# Patient Record
Sex: FEMALE | Race: WHITE | NOT HISPANIC OR LATINO | Employment: OTHER | URBAN - METROPOLITAN AREA
[De-identification: names, ages, dates, MRNs, and addresses within clinical notes are randomized per-mention and may not be internally consistent; named-entity substitution may affect disease eponyms.]

---

## 2018-08-14 PROBLEM — I51.89 DIASTOLIC DYSFUNCTION: Status: ACTIVE | Noted: 2018-08-14

## 2021-05-07 PROBLEM — Z00.00 WELL ADULT EXAM: Status: ACTIVE | Noted: 2021-05-07

## 2021-07-02 RX ORDER — ESTRADIOL 2 MG/1
2 TABLET ORAL DAILY
Qty: 90 TABLET | Refills: 4 | Status: SHIPPED | OUTPATIENT
Start: 2021-07-02 | End: 2022-01-04 | Stop reason: SDUPTHER

## 2021-08-09 PROBLEM — Z00.00 WELL ADULT EXAM: Status: RESOLVED | Noted: 2021-05-07 | Resolved: 2021-08-09

## 2022-01-04 ENCOUNTER — OFFICE VISIT (OUTPATIENT)
Dept: OBSTETRICS AND GYNECOLOGY | Facility: CLINIC | Age: 62
End: 2022-01-04
Payer: COMMERCIAL

## 2022-01-04 VITALS
DIASTOLIC BLOOD PRESSURE: 82 MMHG | SYSTOLIC BLOOD PRESSURE: 128 MMHG | BODY MASS INDEX: 24.25 KG/M2 | WEIGHT: 145.75 LBS

## 2022-01-04 DIAGNOSIS — Z01.419 ENCOUNTER FOR ANNUAL ROUTINE GYNECOLOGICAL EXAMINATION: Primary | ICD-10-CM

## 2022-01-04 DIAGNOSIS — N60.02 BREAST CYST, LEFT: ICD-10-CM

## 2022-01-04 PROCEDURE — 1160F RVW MEDS BY RX/DR IN RCRD: CPT | Mod: CPTII,S$GLB,, | Performed by: OBSTETRICS & GYNECOLOGY

## 2022-01-04 PROCEDURE — 1160F PR REVIEW ALL MEDS BY PRESCRIBER/CLIN PHARMACIST DOCUMENTED: ICD-10-PCS | Mod: CPTII,S$GLB,, | Performed by: OBSTETRICS & GYNECOLOGY

## 2022-01-04 PROCEDURE — 3008F PR BODY MASS INDEX (BMI) DOCUMENTED: ICD-10-PCS | Mod: CPTII,S$GLB,, | Performed by: OBSTETRICS & GYNECOLOGY

## 2022-01-04 PROCEDURE — 3008F BODY MASS INDEX DOCD: CPT | Mod: CPTII,S$GLB,, | Performed by: OBSTETRICS & GYNECOLOGY

## 2022-01-04 PROCEDURE — 3074F SYST BP LT 130 MM HG: CPT | Mod: CPTII,S$GLB,, | Performed by: OBSTETRICS & GYNECOLOGY

## 2022-01-04 PROCEDURE — 99396 PR PREVENTIVE VISIT,EST,40-64: ICD-10-PCS | Mod: S$GLB,,, | Performed by: OBSTETRICS & GYNECOLOGY

## 2022-01-04 PROCEDURE — 1159F MED LIST DOCD IN RCRD: CPT | Mod: CPTII,S$GLB,, | Performed by: OBSTETRICS & GYNECOLOGY

## 2022-01-04 PROCEDURE — 99999 PR PBB SHADOW E&M-EST. PATIENT-LVL III: ICD-10-PCS | Mod: PBBFAC,,, | Performed by: OBSTETRICS & GYNECOLOGY

## 2022-01-04 PROCEDURE — 3074F PR MOST RECENT SYSTOLIC BLOOD PRESSURE < 130 MM HG: ICD-10-PCS | Mod: CPTII,S$GLB,, | Performed by: OBSTETRICS & GYNECOLOGY

## 2022-01-04 PROCEDURE — 99396 PREV VISIT EST AGE 40-64: CPT | Mod: S$GLB,,, | Performed by: OBSTETRICS & GYNECOLOGY

## 2022-01-04 PROCEDURE — 3079F PR MOST RECENT DIASTOLIC BLOOD PRESSURE 80-89 MM HG: ICD-10-PCS | Mod: CPTII,S$GLB,, | Performed by: OBSTETRICS & GYNECOLOGY

## 2022-01-04 PROCEDURE — 1159F PR MEDICATION LIST DOCUMENTED IN MEDICAL RECORD: ICD-10-PCS | Mod: CPTII,S$GLB,, | Performed by: OBSTETRICS & GYNECOLOGY

## 2022-01-04 PROCEDURE — 99999 PR PBB SHADOW E&M-EST. PATIENT-LVL III: CPT | Mod: PBBFAC,,, | Performed by: OBSTETRICS & GYNECOLOGY

## 2022-01-04 PROCEDURE — 3079F DIAST BP 80-89 MM HG: CPT | Mod: CPTII,S$GLB,, | Performed by: OBSTETRICS & GYNECOLOGY

## 2022-01-04 RX ORDER — ESTRADIOL 2 MG/1
2 TABLET ORAL DAILY
Qty: 90 TABLET | Refills: 4 | Status: SHIPPED | OUTPATIENT
Start: 2022-01-04 | End: 2022-01-04

## 2022-01-04 RX ORDER — ESTRADIOL 2 MG/1
2 TABLET ORAL DAILY
Qty: 90 TABLET | Refills: 4 | Status: SHIPPED | OUTPATIENT
Start: 2022-01-04 | End: 2023-01-04 | Stop reason: SDUPTHER

## 2022-01-04 NOTE — PROGRESS NOTES
Chief Complaint   Patient presents with    Well Woman       History and Physical:  No LMP recorded. Patient has had a hysterectomy.       Alice Lindquist is a 61 y.o. WFwho presents today for her routine annual GYN exam. The patient has no Gynecology complaints today. Breast mass no change left breast      Allergies:   Review of patient's allergies indicates:   Allergen Reactions    Cobalt     Lactase     Meperidine     Neomycin sulfate     Shellfish containing products Itching    Tixocortol        History reviewed. No pertinent past medical history.    Past Surgical History:   Procedure Laterality Date    APPENDECTOMY      BREAST CYST ASPIRATION Left     > 10 years @ Dr. Conner office    COLONOSCOPY  02/17/2020        HYSTERECTOMY  2004    Partial    radiation treatment for cancer      8/2013    THYROIDECTOMY         MEDS:   Current Outpatient Medications on File Prior to Visit   Medication Sig Dispense Refill    amoxicillin (AMOXIL) 875 MG tablet Take 1 tablet (875 mg total) by mouth every 12 (twelve) hours. for 10 days 20 tablet 0    estradioL (ESTRACE) 2 MG tablet Take 1 tablet (2 mg total) by mouth once daily. 90 tablet 4    SYNTHROID 112 mcg tablet TK 1 T PO QD  3    valsartan (DIOVAN) 160 MG tablet Take 160 mg by mouth once daily.      benzonatate (TESSALON) 100 MG capsule Take 1 capsule (100 mg total) by mouth 3 (three) times daily as needed for Cough. (Patient not taking: Reported on 1/4/2022) 21 capsule 0    clindamycin (CLEOCIN T) 1 % lotion KARINE EXT AA BID PRF FLARES      desoximetasone (TOPICORT) 0.05 % cream       hydrocortisone 2.5 % cream Apply topically 2 (two) times daily. (Patient not taking: Reported on 1/4/2022) 20 g 0    promethazine-dextromethorphan (PROMETHAZINE-DM) 6.25-15 mg/5 mL Syrp Take 5 mLs by mouth every 8 (eight) hours as needed (cough). (Patient not taking: Reported on 1/4/2022) 150 mL 0    SUPREP BOWEL PREP KIT 17.5-3.13-1.6 gram SolR TAKE IN 2 DIVIDED  DOSES UTD. DRINK 16 OZ WATER AFTER FIRST DOSE AND 32 OZ WATER AFTER SECOND DOSE       No current facility-administered medications on file prior to visit.       OB History        1    Para   1    Term   1            AB        Living           SAB        IAB        Ectopic        Multiple        Live Births                     Social History     Socioeconomic History    Marital status:    Tobacco Use    Smoking status: Never Smoker    Smokeless tobacco: Never Used   Substance and Sexual Activity    Alcohol use: No     Alcohol/week: 0.0 standard drinks     Comment: occasional    Drug use: Never    Sexual activity: Yes     Partners: Female       Family History   Problem Relation Age of Onset    Asthma Mother     Diabetes Mother     Heart disease Mother     Heart disease Father         pacemakr    Cancer Father     Diabetes Father     Kidney disease Maternal Grandmother          Past medical and surgical history reviewed.   I have reviewed the patient's medical history in detail and updated the computerized patient record.        Review of System:   General: no chills, fever, night sweats, weight gain or weight loss  Psychological: no depression or suicidal ideation  Breasts: no new or changing breast lumps, nipple discharge or masses.  Respiratory: no cough, shortness of breath, or wheezing  Cardiovascular: no chest pain or dyspnea on exertion  Gastrointestinal: no abdominal pain, change in bowel habits, or black or bloody stools  Genito-Urinary: no incontinence, urinary frequency/urgency or vulvar/vaginal symptoms, pelvic pain or abnormal vaginal bleeding.  Musculoskeletal: no gait disturbance or muscular weakness      Physical Exam:   /82   Wt 66.1 kg (145 lb 11.6 oz)   BMI 24.25 kg/m²   Constitutional: She is oriented to person, place, and time. She appears well-developed and well-nourished. No distress.  HENT:   Head: Normocephalic and atraumatic.   Eyes: Conjunctivae and  EOM are normal. No scleral icterus.   Neck: Normal range of motion. Neck supple. No tracheal deviation present.   Cardiovascular: Normal rate.    Pulmonary/Chest: Effort normal. No respiratory distress. She exhibits no tenderness.  Breasts: are symmetrical.mass left breast 2 oclock   Right breast exhibits no inverted nipple, no mass, no nipple discharge, no skin change and no tenderness.   Left breast exhibits no inverted nipple, no mass, no nipple discharge, no skin change and no tenderness.  Abdominal: Soft. She exhibits no distension and no mass. There is no tenderness. There is no rebound and no guarding.   Genitourinary:    External rectal exam shows no thrombosed external hemorrhoids.    Pelvic exam was performed with patient supine.   No labial fusion.   There is no rash, lesion or injury on the right labia.   There is no rash, lesion or injury on the left labia.   No bleeding and no signs of injury around the vaginal introitus, urethra is without lesions and well supported.    No vaginal discharge found.    No significant Cystocele, Enterocele or rectocele, and cuff well supported.   Bimanual exam:   The urethra and vagina are without palpable masses or tenderness.   Uterus and cervix are surgically absents, vaginal cuff is intact and well supported.   Right adnexum displays no mass and no tenderness.   Left adnexum displays no mass and no tenderness.  Musculoskeletal: Normal range of motion.   Lymphadenopathy: No inguinal adenopathy present.   Neurological: She is alert and oriented to person, place, and time. Coordination normal.   Skin: Skin is warm and dry. She is not diaphoretic.   Psychiatric: She has a normal mood and affect.        Assessment:   Normal annual GYN exam  1. Encounter for annual routine gynecological examination       Breast mass left no change  Hrt accepts risk/benefits  Plan:   Estrace 2mg q d  PAP  Not Needed  Mammogram diag and sono  Follow up in 1 year.

## 2022-01-19 ENCOUNTER — TELEPHONE (OUTPATIENT)
Dept: OBSTETRICS AND GYNECOLOGY | Facility: CLINIC | Age: 62
End: 2022-01-19
Payer: COMMERCIAL

## 2022-01-19 NOTE — TELEPHONE ENCOUNTER
----- Message from Ester Schmidt RN sent at 1/19/2022  2:22 PM CST -----  Regarding: Orders needed  Patient here today, and needs left breast ultrasound cyst aspiration. May I enter orders for Dr Nowak, and he can simply sign off? She is scheduled for 2/1/22  Thanks,  Joyce Schmidt RN  [facility]

## 2022-01-20 ENCOUNTER — TELEPHONE (OUTPATIENT)
Dept: OBSTETRICS AND GYNECOLOGY | Facility: CLINIC | Age: 62
End: 2022-01-20
Payer: COMMERCIAL

## 2022-01-20 NOTE — TELEPHONE ENCOUNTER
----- Message from Dioni Nowak MD sent at 1/19/2022  3:02 PM CST -----  Aspiration is recommended for the left breast

## 2022-02-07 ENCOUNTER — TELEPHONE (OUTPATIENT)
Dept: OBSTETRICS AND GYNECOLOGY | Facility: CLINIC | Age: 62
End: 2022-02-07
Payer: COMMERCIAL

## 2022-02-07 NOTE — TELEPHONE ENCOUNTER
----- Message from Dioni Nowak MD sent at 2/7/2022 11:35 AM CST -----  Cultures from breast are negative

## 2022-05-16 PROBLEM — Z00.00 ANNUAL PHYSICAL EXAM: Status: ACTIVE | Noted: 2022-05-16

## 2022-05-16 PROBLEM — I10 MODERATE HYPERTENSION: Status: ACTIVE | Noted: 2022-05-16

## 2022-06-29 DIAGNOSIS — U07.1 COVID-19 VIRUS DETECTED: ICD-10-CM

## 2022-08-15 PROBLEM — Z00.00 ANNUAL PHYSICAL EXAM: Status: RESOLVED | Noted: 2022-05-16 | Resolved: 2022-08-15

## 2022-11-29 ENCOUNTER — TELEPHONE (OUTPATIENT)
Dept: OBSTETRICS AND GYNECOLOGY | Facility: CLINIC | Age: 62
End: 2022-11-29
Payer: COMMERCIAL

## 2022-11-29 DIAGNOSIS — Z12.31 ENCOUNTER FOR SCREENING MAMMOGRAM FOR MALIGNANT NEOPLASM OF BREAST: Primary | ICD-10-CM

## 2022-11-29 NOTE — TELEPHONE ENCOUNTER
----- Message from Nini Pratt sent at 11/29/2022 10:48 AM CST -----  Contact: Self  Pt is calling to schedule her yearly mammogram due in January, we need the orders put in for pt to go to the [facility]. Can we please call pt back to schedule at [phone]. Thank You.

## 2023-01-04 ENCOUNTER — OFFICE VISIT (OUTPATIENT)
Dept: OBSTETRICS AND GYNECOLOGY | Facility: CLINIC | Age: 63
End: 2023-01-04
Payer: COMMERCIAL

## 2023-01-04 VITALS
SYSTOLIC BLOOD PRESSURE: 130 MMHG | HEIGHT: 65 IN | RESPIRATION RATE: 14 BRPM | BODY MASS INDEX: 23.73 KG/M2 | DIASTOLIC BLOOD PRESSURE: 78 MMHG | WEIGHT: 142.44 LBS

## 2023-01-04 DIAGNOSIS — Z01.419 ENCOUNTER FOR ANNUAL ROUTINE GYNECOLOGICAL EXAMINATION: Primary | ICD-10-CM

## 2023-01-04 DIAGNOSIS — Z01.419 SMEAR, VAGINAL, AS PART OF ROUTINE GYNECOLOGICAL EXAMINATION: ICD-10-CM

## 2023-01-04 DIAGNOSIS — Z12.72 SMEAR, VAGINAL, AS PART OF ROUTINE GYNECOLOGICAL EXAMINATION: ICD-10-CM

## 2023-01-04 PROCEDURE — 99396 PREV VISIT EST AGE 40-64: CPT | Mod: S$GLB,,, | Performed by: OBSTETRICS & GYNECOLOGY

## 2023-01-04 PROCEDURE — 1159F PR MEDICATION LIST DOCUMENTED IN MEDICAL RECORD: ICD-10-PCS | Mod: CPTII,S$GLB,, | Performed by: OBSTETRICS & GYNECOLOGY

## 2023-01-04 PROCEDURE — 3075F SYST BP GE 130 - 139MM HG: CPT | Mod: CPTII,S$GLB,, | Performed by: OBSTETRICS & GYNECOLOGY

## 2023-01-04 PROCEDURE — 99999 PR PBB SHADOW E&M-EST. PATIENT-LVL III: CPT | Mod: PBBFAC,,, | Performed by: OBSTETRICS & GYNECOLOGY

## 2023-01-04 PROCEDURE — 3078F PR MOST RECENT DIASTOLIC BLOOD PRESSURE < 80 MM HG: ICD-10-PCS | Mod: CPTII,S$GLB,, | Performed by: OBSTETRICS & GYNECOLOGY

## 2023-01-04 PROCEDURE — 99396 PR PREVENTIVE VISIT,EST,40-64: ICD-10-PCS | Mod: S$GLB,,, | Performed by: OBSTETRICS & GYNECOLOGY

## 2023-01-04 PROCEDURE — 3008F BODY MASS INDEX DOCD: CPT | Mod: CPTII,S$GLB,, | Performed by: OBSTETRICS & GYNECOLOGY

## 2023-01-04 PROCEDURE — 99999 PR PBB SHADOW E&M-EST. PATIENT-LVL III: ICD-10-PCS | Mod: PBBFAC,,, | Performed by: OBSTETRICS & GYNECOLOGY

## 2023-01-04 PROCEDURE — 1159F MED LIST DOCD IN RCRD: CPT | Mod: CPTII,S$GLB,, | Performed by: OBSTETRICS & GYNECOLOGY

## 2023-01-04 PROCEDURE — 88175 CYTOPATH C/V AUTO FLUID REDO: CPT | Performed by: OBSTETRICS & GYNECOLOGY

## 2023-01-04 PROCEDURE — 3078F DIAST BP <80 MM HG: CPT | Mod: CPTII,S$GLB,, | Performed by: OBSTETRICS & GYNECOLOGY

## 2023-01-04 PROCEDURE — 1160F RVW MEDS BY RX/DR IN RCRD: CPT | Mod: CPTII,S$GLB,, | Performed by: OBSTETRICS & GYNECOLOGY

## 2023-01-04 PROCEDURE — 3075F PR MOST RECENT SYSTOLIC BLOOD PRESS GE 130-139MM HG: ICD-10-PCS | Mod: CPTII,S$GLB,, | Performed by: OBSTETRICS & GYNECOLOGY

## 2023-01-04 PROCEDURE — 1160F PR REVIEW ALL MEDS BY PRESCRIBER/CLIN PHARMACIST DOCUMENTED: ICD-10-PCS | Mod: CPTII,S$GLB,, | Performed by: OBSTETRICS & GYNECOLOGY

## 2023-01-04 PROCEDURE — 3008F PR BODY MASS INDEX (BMI) DOCUMENTED: ICD-10-PCS | Mod: CPTII,S$GLB,, | Performed by: OBSTETRICS & GYNECOLOGY

## 2023-01-04 RX ORDER — ESTRADIOL 2 MG/1
2 TABLET ORAL DAILY
Qty: 90 TABLET | Refills: 4 | Status: SHIPPED | OUTPATIENT
Start: 2023-01-04 | End: 2024-01-08 | Stop reason: SDUPTHER

## 2023-01-04 NOTE — PROGRESS NOTES
Chief Complaint   Patient presents with    Well Woman     WWE with mammo       History and Physical:  No LMP recorded. Patient has had a hysterectomy.       Alice Lindquist is a 62 y.o. WF who presents today for her routine annual GYN exam. The patient has no Gynecology complaints today. Last yr asp cyst left breast, neg.  Need refill HRT      Allergies:   Review of patient's allergies indicates:   Allergen Reactions    Shellfish containing products Itching    Cobalt Other (See Comments)     Discovered during allergy testing    Lactase Other (See Comments)     Discovered during allergy testing    Meperidine Other (See Comments)     Discovered during allergy testing    Neomycin sulfate Other (See Comments)     Discovered during allergy testing    Tixocortol Other (See Comments)     Discovered during allergy testing       History reviewed. No pertinent past medical history.    Past Surgical History:   Procedure Laterality Date    APPENDECTOMY      BREAST CYST ASPIRATION Left     > 10 years @ Dr. Conner office    COLONOSCOPY  02/17/2020        HYSTERECTOMY  2004    Partial    radiation treatment for cancer      8/2013    THYROIDECTOMY         MEDS:   Current Outpatient Medications on File Prior to Visit   Medication Sig Dispense Refill    SYNTHROID 100 mcg tablet Take 100 mcg by mouth once daily.      valsartan (DIOVAN) 160 MG tablet Take 160 mg by mouth once daily.      [DISCONTINUED] estradioL (ESTRACE) 2 MG tablet Take 1 tablet (2 mg total) by mouth once daily. 90 tablet 4    albuterol (VENTOLIN HFA) 90 mcg/actuation inhaler Inhale 2 puffs into the lungs every 6 (six) hours as needed for Wheezing or Shortness of Breath. Rescue 18 g 0    azelastine (ASTELIN) 137 mcg (0.1 %) nasal spray 1 spray (137 mcg total) by Nasal route 2 (two) times daily as needed for Rhinitis. (Patient not taking: Reported on 1/4/2023) 30 mL 2    clindamycin (CLEOCIN T) 1 % lotion KARINE EXT AA BID PRF FLARES      desoximetasone (TOPICORT)  "0.05 % cream        No current facility-administered medications on file prior to visit.       OB History          1    Para   1    Term   1            AB        Living             SAB        IAB        Ectopic        Multiple        Live Births                     Social History     Socioeconomic History    Marital status:    Tobacco Use    Smoking status: Never    Smokeless tobacco: Never   Substance and Sexual Activity    Alcohol use: No     Alcohol/week: 0.0 standard drinks     Comment: occasional    Drug use: Never    Sexual activity: Yes     Partners: Female       Family History   Problem Relation Age of Onset    Asthma Mother     Diabetes Mother     Heart disease Mother     Heart disease Father         pacemakr    Diabetes Father     Prostate cancer Father     Kidney disease Maternal Grandmother     Kidney cancer Maternal Grandmother     Colon cancer Paternal Grandmother          Past medical and surgical history reviewed.   I have reviewed the patient's medical history in detail and updated the computerized patient record.        Review of System:   General: no chills, fever, night sweats, weight gain or weight loss  Psychological: no depression or suicidal ideation  Breasts: no new or changing breast lumps, nipple discharge or masses.  Respiratory: no cough, shortness of breath, or wheezing  Cardiovascular: no chest pain or dyspnea on exertion  Gastrointestinal: no abdominal pain, change in bowel habits, or black or bloody stools  Genito-Urinary: no incontinence, urinary frequency/urgency or vulvar/vaginal symptoms, pelvic pain or abnormal vaginal bleeding.  Musculoskeletal: no gait disturbance or muscular weakness      Physical Exam:   /78   Resp 14   Ht 5' 5" (1.651 m)   Wt 64.6 kg (142 lb 6.7 oz)   BMI 23.70 kg/m²   Constitutional: She is oriented to person, place, and time. She appears well-developed and well-nourished. No distress.  HENT:   Head: Normocephalic and " atraumatic.   Eyes: Conjunctivae and EOM are normal. No scleral icterus.   Neck: Normal range of motion. Neck supple. No tracheal deviation present.   Cardiovascular: Normal rate.    Pulmonary/Chest: Effort normal. No respiratory distress. She exhibits no tenderness.  Breasts: are symmetrical.no masses   Right breast exhibits no inverted nipple, no mass, no nipple discharge, no skin change and no tenderness.   Left breast exhibits no inverted nipple, no mass, no nipple discharge, no skin change and no tenderness.  Abdominal: Soft. She exhibits no distension and no mass. There is no tenderness. There is no rebound and no guarding.   Genitourinary:    External rectal exam shows no thrombosed external hemorrhoids.    Pelvic exam was performed with patient supine.   No labial fusion.   There is no rash, lesion or injury on the right labia.   There is no rash, lesion or injury on the left labia.   No bleeding and no signs of injury around the vaginal introitus, urethra is without lesions and well supported.    No vaginal discharge found.    No significant Cystocele, Enterocele or rectocele, and cuff well supported.   Bimanual exam:   The urethra and vagina are without palpable masses or tenderness.   Uterus and cervix are surgically absents, vaginal cuff is intact and well supported.   Right adnexum displays no mass and no tenderness.   Left adnexum displays no mass and no tenderness.  Musculoskeletal: Normal range of motion.   Lymphadenopathy: No inguinal adenopathy present.   Neurological: She is alert and oriented to person, place, and time. Coordination normal.   Skin: Skin is warm and dry. She is not diaphoretic.   Psychiatric: She has a normal mood and affect.        Assessment:   Normal annual GYN exam  1. Encounter for annual routine gynecological examination          HRT accepts risk/benefits    Plan:   PAP   Mammogram  Refill estrace 2mg q day  Follow up in 1 year.

## 2023-01-10 LAB
FINAL PATHOLOGIC DIAGNOSIS: NORMAL
Lab: NORMAL

## 2024-01-08 ENCOUNTER — OFFICE VISIT (OUTPATIENT)
Dept: OBSTETRICS AND GYNECOLOGY | Facility: CLINIC | Age: 64
End: 2024-01-08
Payer: COMMERCIAL

## 2024-01-08 VITALS
DIASTOLIC BLOOD PRESSURE: 76 MMHG | BODY MASS INDEX: 25.05 KG/M2 | HEIGHT: 65 IN | WEIGHT: 150.38 LBS | SYSTOLIC BLOOD PRESSURE: 132 MMHG

## 2024-01-08 DIAGNOSIS — Z01.419 ENCOUNTER FOR ANNUAL ROUTINE GYNECOLOGICAL EXAMINATION: Primary | ICD-10-CM

## 2024-01-08 DIAGNOSIS — Z12.39 BREAST SCREENING: ICD-10-CM

## 2024-01-08 DIAGNOSIS — Z79.890 HORMONE REPLACEMENT THERAPY (HRT): ICD-10-CM

## 2024-01-08 PROCEDURE — 1159F MED LIST DOCD IN RCRD: CPT | Mod: CPTII,S$GLB,, | Performed by: OBSTETRICS & GYNECOLOGY

## 2024-01-08 PROCEDURE — 3075F SYST BP GE 130 - 139MM HG: CPT | Mod: CPTII,S$GLB,, | Performed by: OBSTETRICS & GYNECOLOGY

## 2024-01-08 PROCEDURE — 3008F BODY MASS INDEX DOCD: CPT | Mod: CPTII,S$GLB,, | Performed by: OBSTETRICS & GYNECOLOGY

## 2024-01-08 PROCEDURE — 99999 PR PBB SHADOW E&M-EST. PATIENT-LVL III: CPT | Mod: PBBFAC,,, | Performed by: OBSTETRICS & GYNECOLOGY

## 2024-01-08 PROCEDURE — 1160F RVW MEDS BY RX/DR IN RCRD: CPT | Mod: CPTII,S$GLB,, | Performed by: OBSTETRICS & GYNECOLOGY

## 2024-01-08 PROCEDURE — 99396 PREV VISIT EST AGE 40-64: CPT | Mod: S$GLB,,, | Performed by: OBSTETRICS & GYNECOLOGY

## 2024-01-08 PROCEDURE — 3078F DIAST BP <80 MM HG: CPT | Mod: CPTII,S$GLB,, | Performed by: OBSTETRICS & GYNECOLOGY

## 2024-01-08 RX ORDER — ESTRADIOL 2 MG/1
2 TABLET ORAL DAILY
Qty: 90 TABLET | Refills: 4 | Status: SHIPPED | OUTPATIENT
Start: 2024-01-08

## 2024-01-08 NOTE — PROGRESS NOTES
Chief Complaint   Patient presents with    Well Woman       History and Physical:  No LMP recorded. Patient has had a hysterectomy.       Alice Lindquist is a 63 y.o. WFwho presents today for her routine annual GYN exam. The patient has no Gynecology complaints today. Refill HRt      Allergies:   Review of patient's allergies indicates:   Allergen Reactions    Shellfish containing products Itching    Cobalt Other (See Comments)     Discovered during allergy testing    Lactase Other (See Comments)     Discovered during allergy testing    Meperidine Other (See Comments)     Discovered during allergy testing    Neomycin sulfate Other (See Comments)     Discovered during allergy testing    Tixocortol Other (See Comments)     Discovered during allergy testing       History reviewed. No pertinent past medical history.    Past Surgical History:   Procedure Laterality Date    APPENDECTOMY      BREAST CYST ASPIRATION Left     > 10 years @ Dr. Conner office    COLONOSCOPY  02/17/2020        HYSTERECTOMY  2004    Partial    radiation treatment for cancer      8/2013    THYROIDECTOMY         MEDS:   Current Outpatient Medications on File Prior to Visit   Medication Sig Dispense Refill    desoximetasone (TOPICORT) 0.05 % cream       SYNTHROID 100 mcg tablet Take 100 mcg by mouth once daily.      valsartan (DIOVAN) 160 MG tablet Take 160 mg by mouth once daily.      [DISCONTINUED] albuterol (VENTOLIN HFA) 90 mcg/actuation inhaler Inhale 2 puffs into the lungs every 6 (six) hours as needed for Wheezing or Shortness of Breath. Rescue 18 g 0    [DISCONTINUED] azelastine (ASTELIN) 137 mcg (0.1 %) nasal spray 1 spray (137 mcg total) by Nasal route 2 (two) times daily as needed for Rhinitis. (Patient not taking: Reported on 1/4/2023) 30 mL 2    [DISCONTINUED] clindamycin (CLEOCIN T) 1 % lotion KARINE EXT AA BID PRF FLARES      [DISCONTINUED] estradioL (ESTRACE) 2 MG tablet Take 1 tablet (2 mg total) by mouth once daily. 90 tablet 4  "   [DISCONTINUED] hydrocortisone (ANUSOL-HC) 2.5 % rectal cream Place rectally 2 (two) times daily. 28 g 0     No current facility-administered medications on file prior to visit.       OB History          1    Para   1    Term   1            AB        Living             SAB        IAB        Ectopic        Multiple        Live Births                     Social History     Socioeconomic History    Marital status:    Tobacco Use    Smoking status: Never    Smokeless tobacco: Never   Substance and Sexual Activity    Alcohol use: No     Alcohol/week: 0.0 standard drinks of alcohol     Comment: occasional    Drug use: Never    Sexual activity: Yes     Partners: Female       Family History   Problem Relation Age of Onset    Asthma Mother     Diabetes Mother     Heart disease Mother     Heart disease Father         pacemakr    Diabetes Father     Prostate cancer Father     Kidney disease Maternal Grandmother     Kidney cancer Maternal Grandmother     Colon cancer Paternal Grandmother          Past medical and surgical history reviewed.   I have reviewed the patient's medical history in detail and updated the computerized patient record.        Review of System:   General: no chills, fever, night sweats, weight gain or weight loss  Psychological: no depression or suicidal ideation  Breasts: no new or changing breast lumps, nipple discharge or masses.  Respiratory: no cough, shortness of breath, or wheezing  Cardiovascular: no chest pain or dyspnea on exertion  Gastrointestinal: no abdominal pain, change in bowel habits, or black or bloody stools  Genito-Urinary: no incontinence, urinary frequency/urgency or vulvar/vaginal symptoms, pelvic pain or abnormal vaginal bleeding.  Musculoskeletal: no gait disturbance or muscular weakness      Physical Exam:   /76   Ht 5' 5" (1.651 m)   Wt 68.2 kg (150 lb 5.7 oz)   BMI 25.02 kg/m²   Constitutional: She is oriented to person, place, and time. She " appears well-developed and well-nourished. No distress.  HENT:   Head: Normocephalic and atraumatic.   Eyes: Conjunctivae and EOM are normal. No scleral icterus.   Neck: Normal range of motion. Neck supple. No tracheal deviation present.   Cardiovascular: Normal rate.    Pulmonary/Chest: Effort normal. No respiratory distress. She exhibits no tenderness.  Breasts: are symmetrical.no masses   Right breast exhibits no inverted nipple, no mass, no nipple discharge, no skin change and no tenderness.   Left breast exhibits no inverted nipple, no mass, no nipple discharge, no skin change and no tenderness.  Abdominal: Soft. She exhibits no distension and no mass. There is no tenderness. There is no rebound and no guarding.   Genitourinary:    External rectal exam shows no thrombosed external hemorrhoids.    Pelvic exam was performed with patient supine.   No labial fusion.   There is no rash, lesion or injury on the right labia.   There is no rash, lesion or injury on the left labia.   No bleeding and no signs of injury around the vaginal introitus, urethra is without lesions and well supported.    No vaginal discharge found.    No significant Cystocele, Enterocele or rectocele, and cuff well supported.   Bimanual exam:   The urethra and vagina are without palpable masses or tenderness.   Uterus and cervix are surgically absents, vaginal cuff is intact and well supported.   Right adnexum displays no mass and no tenderness.   Left adnexum displays no mass and no tenderness.  Musculoskeletal: Normal range of motion.   Lymphadenopathy: No inguinal adenopathy present.   Neurological: She is alert and oriented to person, place, and time. Coordination normal.   Skin: Skin is warm and dry. She is not diaphoretic.   Psychiatric: She has a normal mood and affect.        Assessment:   Normal annual GYN exam  1. Encounter for annual routine gynecological examination        2. Hormone replacement therapy (HRT)          Prev  hyst    Plan:   PAP  Not Needed  Mammogram  Estrace 2 mg, try 1/2 tab or 1mg q day  Follow up in 1 year.

## 2024-01-30 PROBLEM — C73 MALIGNANT NEOPLASM OF THYROID GLAND: Status: ACTIVE | Noted: 2024-01-30

## 2024-05-06 PROBLEM — Z00.00 ANNUAL PHYSICAL EXAM: Status: RESOLVED | Noted: 2022-05-16 | Resolved: 2024-05-06

## 2024-09-04 ENCOUNTER — PATIENT MESSAGE (OUTPATIENT)
Dept: OBSTETRICS AND GYNECOLOGY | Facility: CLINIC | Age: 64
End: 2024-09-04
Payer: COMMERCIAL

## 2025-01-13 ENCOUNTER — OFFICE VISIT (OUTPATIENT)
Dept: OBSTETRICS AND GYNECOLOGY | Facility: CLINIC | Age: 65
End: 2025-01-13
Payer: COMMERCIAL

## 2025-01-13 VITALS — WEIGHT: 153 LBS | SYSTOLIC BLOOD PRESSURE: 122 MMHG | DIASTOLIC BLOOD PRESSURE: 70 MMHG | BODY MASS INDEX: 25.46 KG/M2

## 2025-01-13 DIAGNOSIS — Z90.710 H/O: HYSTERECTOMY: ICD-10-CM

## 2025-01-13 DIAGNOSIS — Z12.31 ENCOUNTER FOR SCREENING MAMMOGRAM FOR MALIGNANT NEOPLASM OF BREAST: Primary | ICD-10-CM

## 2025-01-13 DIAGNOSIS — Z01.419 ENCOUNTER FOR ANNUAL ROUTINE GYNECOLOGICAL EXAMINATION: ICD-10-CM

## 2025-01-13 DIAGNOSIS — Z79.890 HORMONE REPLACEMENT THERAPY (HRT): ICD-10-CM

## 2025-01-13 PROCEDURE — 3008F BODY MASS INDEX DOCD: CPT | Mod: CPTII,S$GLB,, | Performed by: OBSTETRICS & GYNECOLOGY

## 2025-01-13 PROCEDURE — 99999 PR PBB SHADOW E&M-EST. PATIENT-LVL III: CPT | Mod: PBBFAC,,, | Performed by: OBSTETRICS & GYNECOLOGY

## 2025-01-13 PROCEDURE — 1159F MED LIST DOCD IN RCRD: CPT | Mod: CPTII,S$GLB,, | Performed by: OBSTETRICS & GYNECOLOGY

## 2025-01-13 PROCEDURE — 88175 CYTOPATH C/V AUTO FLUID REDO: CPT | Performed by: OBSTETRICS & GYNECOLOGY

## 2025-01-13 PROCEDURE — 99396 PREV VISIT EST AGE 40-64: CPT | Mod: S$GLB,,, | Performed by: OBSTETRICS & GYNECOLOGY

## 2025-01-13 PROCEDURE — 1160F RVW MEDS BY RX/DR IN RCRD: CPT | Mod: CPTII,S$GLB,, | Performed by: OBSTETRICS & GYNECOLOGY

## 2025-01-13 PROCEDURE — 3078F DIAST BP <80 MM HG: CPT | Mod: CPTII,S$GLB,, | Performed by: OBSTETRICS & GYNECOLOGY

## 2025-01-13 PROCEDURE — 3074F SYST BP LT 130 MM HG: CPT | Mod: CPTII,S$GLB,, | Performed by: OBSTETRICS & GYNECOLOGY

## 2025-01-13 RX ORDER — ESTRADIOL 2 MG/1
2 TABLET ORAL DAILY
Qty: 90 TABLET | Refills: 4 | Status: SHIPPED | OUTPATIENT
Start: 2025-01-13

## 2025-01-13 NOTE — PROGRESS NOTES
Chief Complaint   Patient presents with    Annual Exam       History and Physical:  No LMP recorded. Patient has had a hysterectomy.       Alice Lindquist is a 64 y.o. WF who presents today for her routine annual GYN exam. The patient has no Gynecology complaints today. Had hair loss when took  estardiol      Allergies:   Review of patient's allergies indicates:   Allergen Reactions    Shellfish containing products Itching    Cobalt Other (See Comments)     Discovered during allergy testing    Lactase Other (See Comments)     Discovered during allergy testing    Meperidine Other (See Comments)     Discovered during allergy testing    Neomycin sulfate Other (See Comments)     Discovered during allergy testing    Tixocortol Other (See Comments)     Discovered during allergy testing       History reviewed. No pertinent past medical history.    Past Surgical History:   Procedure Laterality Date    APPENDECTOMY      BREAST CYST ASPIRATION Left     > 10 years @ Dr. Conner office    COLONOSCOPY  2020        HYSTERECTOMY  2004    Partial    radiation treatment for cancer      2013    THYROIDECTOMY         MEDS:   Current Outpatient Medications on File Prior to Visit   Medication Sig Dispense Refill    BIOTIN ORAL Take 9,000 mcg by mouth.      calcium cit/Mgox/vit D3/B6/min (CITRACAL PLUS ORAL) Take by mouth.      docosahexaenoic acid/epa (FISH OIL ORAL) Take 4,000 mg by mouth.      metoprolol succinate (TOPROL-XL) 25 MG 24 hr tablet Take by mouth once daily. Taking 1/2      SYNTHROID 88 mcg tablet Take 88 mcg by mouth.      valsartan (DIOVAN) 160 MG tablet Take 160 mg by mouth once daily.      [DISCONTINUED] estradioL (ESTRACE) 2 MG tablet Take 1 tablet (2 mg total) by mouth once daily. 90 tablet 4    [DISCONTINUED] azelastine (ASTELIN) 137 mcg (0.1 %) nasal spray USE ONE SPRAY IN EACH NOSTRIL TWICE DAILY AS NEEDED FOR RHINITIS 90 mL 1    [DISCONTINUED] neomycin-polymyxin-dexamethasone (MAXITROL)  3.5mg/mL-10,000 unit/mL-0.1 % DrpS Place 1 drop into the right eye 2 (two) times daily.       No current facility-administered medications on file prior to visit.       OB History          1    Para   1    Term   1            AB        Living             SAB        IAB        Ectopic        Multiple        Live Births                     Social History     Socioeconomic History    Marital status:    Tobacco Use    Smoking status: Never    Smokeless tobacco: Never   Substance and Sexual Activity    Alcohol use: No     Alcohol/week: 0.0 standard drinks of alcohol     Comment: occasional    Drug use: Never    Sexual activity: Yes     Partners: Female     Social Drivers of Health     Financial Resource Strain: Low Risk  (2023)    Received from [facility]    Overall Financial Resource Strain (CARDIA)     Difficulty of Paying Living Expenses: Not very hard   Food Insecurity: No Food Insecurity (2023)    Received from [facility]    Hunger Vital Sign     Worried About Running Out of Food in the Last Year: Never true     Ran Out of Food in the Last Year: Never true   Transportation Needs: No Transportation Needs (2023)    Received from [facility]    PRAPARE - Transportation     Lack of Transportation (Medical): No     Lack of Transportation (Non-Medical): No   Physical Activity: Inactive (2023)    Received from [facility]    Exercise Vital Sign     Days of Exercise per Week: 0 days     Minutes of Exercise per Session: 0 min   Housing Stability: Low Risk  (2023)    Received from [facility]    Housing Stability Vital Sign     Unable to Pay for Housing in the Last Year: No     Number of Places Lived in the Last Year: 1     Unstable Housing in the Last Year: No       Family History   Problem Relation Name Age of Onset    Asthma Mother      Diabetes Mother      Heart disease Mother      Heart disease Father           pacemakr    Diabetes Father      Prostate cancer Father      Kidney disease Maternal Grandmother      Kidney cancer Maternal Grandmother      Colon cancer Paternal Grandmother           Past medical and surgical history reviewed.   I have reviewed the patient's medical history in detail and updated the computerized patient record.        Review of System:   General: no chills, fever, night sweats, weight gain or weight loss  Psychological: no depression or suicidal ideation  Breasts: no new or changing breast lumps, nipple discharge or masses.  Respiratory: no cough, shortness of breath, or wheezing  Cardiovascular: no chest pain or dyspnea on exertion  Gastrointestinal: no abdominal pain, change in bowel habits, or black or bloody stools  Genito-Urinary: no incontinence, urinary frequency/urgency or vulvar/vaginal symptoms, pelvic pain or abnormal vaginal bleeding.  Musculoskeletal: no gait disturbance or muscular weakness      Physical Exam:   /70 (BP Location: Right arm, Patient Position: Sitting)   Wt 69.4 kg (153 lb)   BMI 25.46 kg/m²   Constitutional: She is oriented to person, place, and time. She appears well-developed and well-nourished. No distress.  HENT:   Head: Normocephalic and atraumatic.   Eyes: Conjunctivae and EOM are normal. No scleral icterus.   Neck: Normal range of motion. Neck supple. No tracheal deviation present.   Cardiovascular: Normal rate.    Pulmonary/Chest: Effort normal. No respiratory distress. She exhibits no tenderness.  Breasts: are symmetrical.no masses    Right breast exhibits no inverted nipple, no mass, no nipple discharge, no skin change and no tenderness.   Left breast exhibits no inverted nipple, no mass, no nipple discharge, no skin change and no tenderness.  Abdominal: Soft. She exhibits no distension and no mass. There is no tenderness. There is no rebound and no guarding.   Genitourinary:    External rectal exam shows no thrombosed external hemorrhoids.     Pelvic exam was performed with patient supine.   No labial fusion.   There is no rash, lesion or injury on the right labia.   There is no rash, lesion or injury on the left labia.   No bleeding and no signs of injury around the vaginal introitus, urethra is without lesions and well supported.    No vaginal discharge found.    No significant Cystocele, Enterocele or rectocele, and cuff well supported.   Bimanual exam:   The urethra and vagina are without palpable masses or tenderness.   Uterus and cervix are surgically absents, vaginal cuff is intact and well supported.   Right adnexum displays no mass and no tenderness.   Left adnexum displays no mass and no tenderness.  Musculoskeletal: Normal range of motion.   Lymphadenopathy: No inguinal adenopathy present.   Neurological: She is alert and oriented to person, place, and time. Coordination normal.   Skin: Skin is warm and dry. She is not diaphoretic.   Psychiatric: She has a normal mood and affect.        Assessment:   Normal annual GYN exam  1. Encounter for screening mammogram for malignant neoplasm of breast  Mammo Digital Screening Bilat w/ Radames      2. Encounter for annual routine gynecological examination  Liquid-Based Pap Smear, Screening      3. Hormone replacement therapy (HRT)        4. H/O: hysterectomy          Accepts risk/benefits HRT- had hair loss when off HRT    Plan:   PAP    Mammogram  Refill estrace 2mg  Follow up in 1 year.

## 2025-01-19 LAB
FINAL PATHOLOGIC DIAGNOSIS: NORMAL
Lab: NORMAL

## 2025-02-06 ENCOUNTER — OFFICE VISIT (OUTPATIENT)
Dept: PODIATRY | Facility: CLINIC | Age: 65
End: 2025-02-06
Payer: COMMERCIAL

## 2025-02-06 VITALS — BODY MASS INDEX: 24.17 KG/M2 | HEIGHT: 65 IN | WEIGHT: 145.06 LBS

## 2025-02-06 DIAGNOSIS — S90.121A CONTUSION OF RIGHT FOOT INCLUDING TOES, INITIAL ENCOUNTER: Primary | ICD-10-CM

## 2025-02-06 DIAGNOSIS — S90.31XA CONTUSION OF RIGHT FOOT INCLUDING TOES, INITIAL ENCOUNTER: Primary | ICD-10-CM

## 2025-02-06 PROCEDURE — 99999 PR PBB SHADOW E&M-EST. PATIENT-LVL III: CPT | Mod: PBBFAC,,, | Performed by: PODIATRIST

## 2025-02-06 PROCEDURE — 3008F BODY MASS INDEX DOCD: CPT | Mod: CPTII,S$GLB,, | Performed by: PODIATRIST

## 2025-02-06 PROCEDURE — 1159F MED LIST DOCD IN RCRD: CPT | Mod: CPTII,S$GLB,, | Performed by: PODIATRIST

## 2025-02-06 PROCEDURE — 99204 OFFICE O/P NEW MOD 45 MIN: CPT | Mod: S$GLB,,, | Performed by: PODIATRIST

## 2025-02-09 NOTE — PROGRESS NOTES
Subjective:     Patient ID: Alice Lindquist is a 64 y.o. female.    Chief Complaint: Foot Injury (Previously fractured great toe, 2nd & 3rd digits have pain. Right foot pain)    Alice is a 64 y.o. female who presents to the clinic in regards to a prior to the Rt. 1st - 3rd toes that occurred over 6 weeks ago.  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  Notes subsequent swelling and bruising that developed to the base of said toes in addition to ensuing pain.  Relates presenting to family medicine where x-rays were questionable for fractures of the distal phalanx. States pain symptoms have improved since the injury.  Rates pain as a 2/10 with prolonged weight bearing.  Denies symptoms while at rest.  Inquires as to if she is following the normal course post injury.  Denies any additional pedal complaints.      No past medical history on file.    Past Surgical History:   Procedure Laterality Date    APPENDECTOMY      BREAST CYST ASPIRATION Left     > 10 years @ Dr. Conner office    COLONOSCOPY  02/17/2020        HYSTERECTOMY  2004    Partial    radiation treatment for cancer      8/2013    THYROIDECTOMY         Family History   Problem Relation Name Age of Onset    Asthma Mother      Diabetes Mother      Heart disease Mother      Heart disease Father          pacemakr    Diabetes Father      Prostate cancer Father      Kidney disease Maternal Grandmother      Kidney cancer Maternal Grandmother      Colon cancer Paternal Grandmother         Social History     Socioeconomic History    Marital status:    Tobacco Use    Smoking status: Never    Smokeless tobacco: Never   Substance and Sexual Activity    Alcohol use: No     Alcohol/week: 0.0 standard drinks of alcohol     Comment: occasional    Drug use: Never    Sexual activity: Yes     Partners: Female     Social Drivers of Health     Financial Resource Strain: Low Risk  (7/23/2023)    [facility]     Overall Financial Resource Strain (CARDIA)     Difficulty of Paying Living Expenses: Not very hard   Food Insecurity: No Food Insecurity (7/23/2023)    Received from [facility]    Hunger Vital Sign     Worried About Running Out of Food in the Last Year: Never true     Ran Out of Food in the Last Year: Never true   Transportation Needs: No Transportation Needs (7/23/2023)    Received from [facility]    PRAPARE - Transportation     Lack of Transportation (Medical): No     Lack of Transportation (Non-Medical): No   Physical Activity: Inactive (7/23/2023)    Received from [facility] [facility]    Exercise Vital Sign     Days of Exercise per Week: 0 days     Minutes of Exercise per Session: 0 min   Housing Stability: Low Risk  (7/23/2023)    Received from [facility]    Housing Stability Vital Sign     Unable to Pay for Housing in the Last Year: No     Number of Places Lived in the Last Year: 1     Unstable Housing in the Last Year: No       Current Outpatient Medications   Medication Sig Dispense Refill    BIOTIN ORAL Take 9,000 mcg by mouth.      calcium cit/Mgox/vit D3/B6/min (CITRACAL PLUS ORAL) Take by mouth.      docosahexaenoic acid/epa (FISH OIL ORAL) Take 4,000 mg by mouth.      estradioL (ESTRACE) 2 MG tablet Take 1 tablet (2 mg total) by mouth once daily. 90 tablet 4    metoprolol succinate (TOPROL-XL) 25 MG 24 hr tablet Take by mouth once daily. Taking 1/2      SYNTHROID 88 mcg tablet Take 88 mcg by mouth.      valsartan (DIOVAN) 160 MG tablet Take 160 mg by mouth once daily.       No current facility-administered medications for this visit.       Review of patient's allergies indicates:   Allergen Reactions    Shellfish containing products Itching    Cobalt Other (See Comments)     Discovered during allergy testing    Lactase Other (See Comments)     Discovered during allergy testing    Meperidine Other (See Comments)     Discovered during allergy testing     Neomycin sulfate Other (See Comments)     Discovered during allergy testing    Tixocortol Other (See Comments)     Discovered during allergy testing        Hemoglobin A1C   Date Value Ref Range Status   03/06/2024 5.4 0.0 - 5.6 % Final     Comment:     Reference Interval:  5.0 - 5.6 Normal   5.7 - 6.4 High Risk   > 6.5 Diabetic      Hgb A1c results are standardized based on the (NGSP) National   Glycohemoglobin Standardization Program.      Hemoglobin A1C levels are related to mean serum/plasma glucose   during the preceding 2-3 months.            Review of Systems   Constitutional: Negative for chills and fever.   Skin:  Negative for color change and nail changes.   Musculoskeletal:  Positive for myalgias. Negative for joint swelling, muscle cramps and muscle weakness.   Gastrointestinal:  Negative for nausea and vomiting.   Neurological:  Negative for numbness and paresthesias.   Psychiatric/Behavioral:  Negative for altered mental status.         Objective:     Physical Exam  Constitutional:       Appearance: Normal appearance. She is not ill-appearing.   Cardiovascular:      Pulses:           Dorsalis pedis pulses are 2+ on the right side and 2+ on the left side.        Posterior tibial pulses are 2+ on the right side and 2+ on the left side.      Comments: CFT is < 3 seconds bilateral.  Pedal hair growth is intact bilateral.  Mild localized edema noted to the Rt. 3rd toe.  Toes are warm to touch bilateral.    Musculoskeletal:         General: Tenderness and signs of injury present.      Right lower leg: No edema.      Left lower leg: No edema.      Comments: Muscle strength 5/5 in all muscle groups bilateral.  No tenderness nor crepitation with ROM of foot/ankle joints bilateral.  Mild pain with palpation to the proximal phalanx of the Rt. 3rd toe.  Normal gait noted on exam.   Skin:     Capillary Refill: Capillary refill takes 2 to 3 seconds.      Findings: No bruising, ecchymosis, erythema, signs of injury,  laceration, lesion, petechiae, rash or wound.      Comments: Pedal skin has normal turgor, temperature, and texture bilateral.  Toenails x 10 appear normotrophic.    Neurological:      General: No focal deficit present.      Mental Status: She is alert.      Sensory: No sensory deficit.      Motor: No weakness or atrophy.      Comments: Light touch is intact bilateral.             Assessment:      Encounter Diagnosis   Name Primary?    Contusion of right foot including toes, initial encounter Yes     Plan:     Alice was seen today for foot injury.    Diagnoses and all orders for this visit:    Contusion of right foot including toes, initial encounter      I counseled the patient on her conditions, their implications and medical management.    Based on today's exam, I suspect a simple contusion of toes 1-3 of the Rt. Foot.    Personally evaluated plain films, however, I do not see evidence of a fracture.  Sclerosis is noted to the distal phalanx, however, there is evidence of multiple lines of sclerosis the length of the proximal phalanx making this a variant of normal.    Explained it can take up to 8 full weeks for pain symptoms from a contusion to resolve.    At this point, she is able to wear shoe gear and perform all weight bearing activities to tolerance.    RTC prn.    Carlos Christian DPM

## 2025-03-24 RX ORDER — ESTRADIOL 2 MG/1
2 TABLET ORAL
Qty: 90 TABLET | Refills: 3 | Status: SHIPPED | OUTPATIENT
Start: 2025-03-24

## 2025-04-03 PROBLEM — Z00.00 ANNUAL WELLNESS VISIT: Status: ACTIVE | Noted: 2025-04-03

## 2025-04-03 PROBLEM — C73 MALIGNANT NEOPLASM OF THYROID GLAND: Status: RESOLVED | Noted: 2024-01-30 | Resolved: 2025-04-03

## 2025-04-19 ENCOUNTER — PATIENT MESSAGE (OUTPATIENT)
Dept: OBSTETRICS AND GYNECOLOGY | Facility: CLINIC | Age: 65
End: 2025-04-19
Payer: COMMERCIAL

## 2025-04-28 ENCOUNTER — OFFICE VISIT (OUTPATIENT)
Dept: OBSTETRICS AND GYNECOLOGY | Facility: CLINIC | Age: 65
End: 2025-04-28
Payer: COMMERCIAL

## 2025-04-28 DIAGNOSIS — Z90.710 H/O: HYSTERECTOMY: ICD-10-CM

## 2025-04-28 DIAGNOSIS — L65.9 HAIR LOSS: Primary | ICD-10-CM

## 2025-04-28 DIAGNOSIS — Z79.890 HORMONE REPLACEMENT THERAPY (HRT): ICD-10-CM

## 2025-04-28 DIAGNOSIS — Z85.850 HISTORY OF THYROID CANCER: ICD-10-CM

## 2025-04-28 PROCEDURE — 1160F RVW MEDS BY RX/DR IN RCRD: CPT | Mod: CPTII,95,, | Performed by: OBSTETRICS & GYNECOLOGY

## 2025-04-28 PROCEDURE — 98005 SYNCH AUDIO-VIDEO EST LOW 20: CPT | Mod: 95,,, | Performed by: OBSTETRICS & GYNECOLOGY

## 2025-04-28 PROCEDURE — 4010F ACE/ARB THERAPY RXD/TAKEN: CPT | Mod: CPTII,95,, | Performed by: OBSTETRICS & GYNECOLOGY

## 2025-04-28 PROCEDURE — 1159F MED LIST DOCD IN RCRD: CPT | Mod: CPTII,95,, | Performed by: OBSTETRICS & GYNECOLOGY

## 2025-04-28 NOTE — PROGRESS NOTES
"The chief complaint   The patient location is: [place]    Visit type: audiovisual    Face to Face time with patient:   20 minutes of total time spent on the encounter, which includes face to face time and non-face to face time preparing to see the patient (eg, review of tests), Obtaining and/or reviewing separately obtained history, Documenting clinical information in the electronic or other health record, Independently interpreting results (not separately reported) and communicating results to the patient/family/caregiver, or Care coordination (not separately reported).         Each patient to whom he or she provides medical services by telemedicine is:  (1) informed of the relationship between the physician and patient and the respective role of any other health care provider with respect to management of the patient; and (2) notified that he or she may decline to receive medical services by telemedicine and may withdraw from such care at any time.    Notes:      History of Present Illness   64 y.o. WF   patient presents today for virtual visit to discuss hair loss and having "hormone levels checked".  Pt has Thyroid Cancer hx and is on Synthroid and sees Dr Rodriguez.  Last levels were in normal range.  She was found to be anemic and had colonoscopy that was negative.  She currently alternates estrace 2mg and 1mg each day.  Hair loss has become a problem.    Past medical and surgical history reviewed.   I have reviewed the patient's medical history in detail and updated the computerized patient record.    Review of patient's allergies indicates:   Allergen Reactions    Minoxidil Itching     Joint pain    Shellfish containing products Itching    Cobalt Other (See Comments)     Discovered during allergy testing    Lactase Other (See Comments)     Discovered during allergy testing    Meperidine Other (See Comments)     Discovered during allergy testing    Neomycin sulfate Other (See Comments)     Discovered " during allergy testing    Tixocortol Other (See Comments)     Discovered during allergy testing         Review of Systems -   GEN ROS: positive for  - hair loss  Breast ROS: negative for breast lumps  Genito-Urinary ROS: negative      Physical Examination:    virtual      Assessment:    1. Hair loss        2. Hormone replacement therapy (HRT)        3. H/O: hysterectomy        4. History of thyroid cancer            Plan:  Check estradiol, free test. And CBC  Consider changing HRT based on result  Patient informed will be contacted with results within 2 weeks. Encouraged to please call back or email if she has not heard from us by then.      T

## 2025-05-06 ENCOUNTER — RESULTS FOLLOW-UP (OUTPATIENT)
Dept: OBSTETRICS AND GYNECOLOGY | Facility: CLINIC | Age: 65
End: 2025-05-06

## 2025-06-16 ENCOUNTER — OFFICE VISIT (OUTPATIENT)
Dept: URGENT CARE | Facility: CLINIC | Age: 65
End: 2025-06-16
Payer: COMMERCIAL

## 2025-06-16 VITALS
WEIGHT: 153 LBS | RESPIRATION RATE: 18 BRPM | SYSTOLIC BLOOD PRESSURE: 125 MMHG | HEART RATE: 68 BPM | HEIGHT: 65 IN | TEMPERATURE: 98 F | DIASTOLIC BLOOD PRESSURE: 77 MMHG | OXYGEN SATURATION: 98 % | BODY MASS INDEX: 25.49 KG/M2

## 2025-06-16 DIAGNOSIS — R09.82 POSTNASAL DRIP: ICD-10-CM

## 2025-06-16 DIAGNOSIS — J02.9 SORE THROAT: Primary | ICD-10-CM

## 2025-06-16 LAB
CTP QC/QA: YES
MOLECULAR STREP A: NEGATIVE

## 2025-06-16 PROCEDURE — 87651 STREP A DNA AMP PROBE: CPT | Mod: QW,S$GLB,, | Performed by: PHYSICIAN ASSISTANT

## 2025-06-16 PROCEDURE — 99214 OFFICE O/P EST MOD 30 MIN: CPT | Mod: S$GLB,,, | Performed by: PHYSICIAN ASSISTANT

## 2025-06-16 NOTE — PROGRESS NOTES
"Subjective:      Patient ID: Alice Lindquist is a 65 y.o. female.    Vitals:  height is 5' 5" (1.651 m) and weight is 69.4 kg (153 lb). Her oral temperature is 98.2 °F (36.8 °C). Her blood pressure is 125/77 and her pulse is 68. Her respiration is 18 and oxygen saturation is 98%.     Chief Complaint: Sore Throat    Patient c/o sore throat onset Friday night .  Cough drops was taken some relief.  Pain scale 6/10    Sore Throat   This is a new problem. The current episode started in the past 7 days. The problem has been unchanged. There has been no fever. The pain is at a severity of 6/10. The pain is severe. Associated symptoms include congestion. Pertinent negatives include no abdominal pain, coughing, diarrhea, drooling, ear discharge, ear pain, headaches, hoarse voice, plugged ear sensation, neck pain, shortness of breath, stridor, swollen glands, trouble swallowing or vomiting. She has had no exposure to strep or mono. She has tried NSAIDs (cough drops) for the symptoms. The treatment provided mild relief.     Constitution: Negative for chills, sweating, fatigue and fever.   HENT:  Positive for congestion, postnasal drip, sinus pressure and sore throat. Negative for ear pain, ear discharge, drooling, nosebleeds, foreign body in nose, sinus pain, trouble swallowing and voice change.    Neck: Negative for neck pain, neck stiffness, painful lymph nodes and neck swelling.   Cardiovascular:  Negative for chest pain, leg swelling, palpitations, sob on exertion and passing out.   Eyes:  Negative for eye pain, eye redness, photophobia, double vision, blurred vision and eyelid swelling.   Respiratory:  Negative for chest tightness, cough, sputum production, bloody sputum, shortness of breath, stridor and wheezing.    Gastrointestinal:  Negative for abdominal pain, abdominal bloating, nausea, vomiting, constipation, diarrhea and heartburn.   Musculoskeletal:  Negative for joint pain, joint swelling, abnormal ROM of joint, " back pain, muscle cramps and muscle ache.   Skin:  Negative for rash and hives.   Allergic/Immunologic: Negative for seasonal allergies, food allergies, hives, itching and sneezing.   Neurological:  Negative for dizziness, light-headedness, passing out, facial drooping, speech difficulty, loss of balance, headaches, altered mental status, loss of consciousness and seizures.   Hematologic/Lymphatic: Negative for swollen lymph nodes.   Psychiatric/Behavioral:  Negative for altered mental status and nervous/anxious. The patient is not nervous/anxious.       Objective:     Physical Exam   Constitutional: She is oriented to person, place, and time. She appears well-developed. She is cooperative.  Non-toxic appearance. She does not appear ill. No distress.   HENT:   Head: Normocephalic and atraumatic.   Ears:   Right Ear: Hearing, tympanic membrane, external ear and ear canal normal.   Left Ear: Hearing, tympanic membrane, external ear and ear canal normal.   Nose: Mucosal edema and rhinorrhea present. No nasal deformity. No epistaxis. Right sinus exhibits no maxillary sinus tenderness and no frontal sinus tenderness. Left sinus exhibits no maxillary sinus tenderness and no frontal sinus tenderness.   Mouth/Throat: Uvula is midline and mucous membranes are normal. No trismus in the jaw. Normal dentition. No uvula swelling. Posterior oropharyngeal erythema and cobblestoning present. No oropharyngeal exudate, posterior oropharyngeal edema or tonsillar abscesses. No tonsillar exudate.   Eyes: Conjunctivae and lids are normal. No scleral icterus.   Neck: Trachea normal and phonation normal. Neck supple. No edema present. No erythema present. No neck rigidity present.   Cardiovascular: Normal rate, regular rhythm, normal heart sounds and normal pulses.   Pulmonary/Chest: Effort normal and breath sounds normal. No accessory muscle usage or stridor. No respiratory distress. She has no decreased breath sounds. She has no wheezes.  She has no rhonchi. She has no rales.   Abdominal: Normal appearance.   Musculoskeletal: Normal range of motion.         General: No deformity or edema. Normal range of motion.   Lymphadenopathy:     She has no cervical adenopathy.   Neurological: She is alert and oriented to person, place, and time. She exhibits normal muscle tone. Coordination normal.   Skin: Skin is warm, dry, intact, not diaphoretic, not pale and no rash. Capillary refill takes less than 2 seconds.   Psychiatric: Her speech is normal and behavior is normal. Judgment and thought content normal.   Nursing note and vitals reviewed.    Results for orders placed or performed in visit on 06/16/25   POCT Strep A, Molecular    Collection Time: 06/16/25 12:42 PM   Result Value Ref Range    Molecular Strep A, POC Negative Negative     Acceptable Yes      Assessment:     1. Sore throat    2. Postnasal drip        Plan:   Discussed test results done today in clinic with patient. Advised close follow-up with PCP and/or Specialist for further evaluation as needed. ER precautions given to patient as well. Patient aware, verbalized understanding and agreed with plan of care.    Sore throat  -     POCT Strep A, Molecular    Postnasal drip      Patient Instructions   If you were prescribed a narcotic or controlled medication, do not drive or operate heavy equipment or machinery while taking these medications.  You must understand that you've received an Urgent Care treatment only and that you may be released before all your medical problems are known or treated. You, the patient, will arrange for follow up care as instructed.  Follow up with your PCP or specialty clinic as directed if not improved or as needed. You can call [phone] to schedule an appointment with the appropriate provider.  If your condition worsens we recommend that you receive another evaluation at the Emergency Department for any concerns or worsening of condition.  Patient  aware and verbalized understanding.    Reviewed test results with patient.  Increase fluids: Cool liquids as much as possible.   Avoid any foods or beverages that may cause irritation to the throat (spicy, acidic, rough, etc.).  Rest is important.  Avoid contact with sick individuals.  Humidifier use at home.  THROW AWAY TOOTHBRUSH AND START WITH NEW ONE AS DISCUSSED (at least 2-3 days of being diagnosed and treated, but before finishing antibiotics - this is to help prevent possible bacteria on toothbrush from reinfection.)  AVOID SHARING FOOD/DRINK AS DISCUSSED.   Can take OTC Claritin or Zyrtec or Allegra (plain) daily as needed for seasonal allergies/nasal congestion, etc.  Can take OTC Flonase Nasal [place] as needed for nasal congestion/seasonal allergies, etc.  Can take OTC Benadryl at nighttime as needed - will cause drowsiness.  Can take OTC Tylenol or Motrin UNLESS CONTRAINDICATED (liver and/or kidney issues, etc.) every 4 - 6 hours as needed for fever or pain, etc.  Follow-up with your PCP in the next 24-72hrs or sooner for re-evaluation especially if no improvement in symptoms.  ER precautions given to patient.  Patient aware, verbalized understanding and agreed with plan of care.

## 2025-06-16 NOTE — PATIENT INSTRUCTIONS
If you were prescribed a narcotic or controlled medication, do not drive or operate heavy equipment or machinery while taking these medications.  You must understand that you've received an Urgent Care treatment only and that you may be released before all your medical problems are known or treated. You, the patient, will arrange for follow up care as instructed.  Follow up with your PCP or specialty clinic as directed if not improved or as needed. You can call [phone] to schedule an appointment with the appropriate provider.  If your condition worsens we recommend that you receive another evaluation at the Emergency Department for any concerns or worsening of condition.  Patient aware and verbalized understanding.    Reviewed test results with patient.  Increase fluids: Cool liquids as much as possible.   Avoid any foods or beverages that may cause irritation to the throat (spicy, acidic, rough, etc.).  Rest is important.  Avoid contact with sick individuals.  Humidifier use at home.  THROW AWAY TOOTHBRUSH AND START WITH NEW ONE AS DISCUSSED (at least 2-3 days of being diagnosed and treated, but before finishing antibiotics - this is to help prevent possible bacteria on toothbrush from reinfection.)  AVOID SHARING FOOD/DRINK AS DISCUSSED.   Can take OTC Claritin or Zyrtec or Allegra (plain) daily as needed for seasonal allergies/nasal congestion, etc.  Can take OTC Flonase Nasal [place] as needed for nasal congestion/seasonal allergies, etc.  Can take OTC Benadryl at nighttime as needed - will cause drowsiness.  Can take OTC Tylenol or Motrin UNLESS CONTRAINDICATED (liver and/or kidney issues, etc.) every 4 - 6 hours as needed for fever or pain, etc.  Follow-up with your PCP in the next 24-72hrs or sooner for re-evaluation especially if no improvement in symptoms.  ER precautions given to patient.  Patient aware, verbalized understanding and agreed with plan of care.